# Patient Record
Sex: FEMALE | Race: WHITE | NOT HISPANIC OR LATINO | URBAN - METROPOLITAN AREA
[De-identification: names, ages, dates, MRNs, and addresses within clinical notes are randomized per-mention and may not be internally consistent; named-entity substitution may affect disease eponyms.]

---

## 2022-05-12 ENCOUNTER — POST-OP (OUTPATIENT)
Dept: URBAN - METROPOLITAN AREA CLINIC 15 | Facility: CLINIC | Age: 72
End: 2022-05-12

## 2022-05-12 DIAGNOSIS — C44.1192: ICD-10-CM

## 2022-05-12 DIAGNOSIS — Z98.890: ICD-10-CM

## 2022-05-12 PROCEDURE — 99024 POSTOP FOLLOW-UP VISIT: CPT

## 2022-05-12 ASSESSMENT — VISUAL ACUITY: OU_CC: 20/30

## 2022-07-09 RX ORDER — ACYCLOVIR 400 MG/1
TABLET ORAL
COMMUNITY

## 2022-07-09 RX ORDER — LORATADINE 10 MG/1
TABLET ORAL
COMMUNITY

## 2022-07-09 RX ORDER — ESTRADIOL 1 MG/1
TABLET ORAL
COMMUNITY

## 2022-07-09 RX ORDER — GABAPENTIN 300 MG/1
1 CAPSULE ORAL
COMMUNITY

## 2022-07-11 ENCOUNTER — ESTABLISHED PATIENT (OUTPATIENT)
Dept: URBAN - METROPOLITAN AREA CLINIC 14 | Facility: CLINIC | Age: 72
End: 2022-07-11

## 2022-07-11 DIAGNOSIS — H43.813: ICD-10-CM

## 2022-07-11 DIAGNOSIS — H25.13: ICD-10-CM

## 2022-07-11 PROCEDURE — 99214 OFFICE O/P EST MOD 30 MIN: CPT

## 2022-07-11 PROCEDURE — 92136 OPHTHALMIC BIOMETRY: CPT

## 2022-07-11 ASSESSMENT — KERATOMETRY
OD_K1POWER_DIOPTERS: 45.00
OS_K2POWER_DIOPTERS: 45.75
OS_AXISANGLE2_DEGREES: 157
OS_K1POWER_DIOPTERS: 45.00
OS_AXISANGLE_DEGREES: 67
OD_K2POWER_DIOPTERS: 46.00
OD_AXISANGLE_DEGREES: 163
OD_AXISANGLE2_DEGREES: 73

## 2022-07-11 ASSESSMENT — VISUAL ACUITY
OD_PH: 20/50
OS_PH: 20/50
OD_CC: 20/70
OS_CC: 20/70
OS_BCVA: 20/50
OD_BCVA: 20/50

## 2022-07-11 ASSESSMENT — TONOMETRY
OS_IOP_MMHG: 23
OD_IOP_MMHG: 26

## 2022-07-13 ENCOUNTER — POST-OP (OUTPATIENT)
Dept: URBAN - METROPOLITAN AREA CLINIC 14 | Facility: CLINIC | Age: 72
End: 2022-07-13

## 2022-07-13 DIAGNOSIS — C44.1192: ICD-10-CM

## 2022-07-13 DIAGNOSIS — Z98.890: ICD-10-CM

## 2022-07-13 PROCEDURE — 99024 POSTOP FOLLOW-UP VISIT: CPT

## 2022-07-13 ASSESSMENT — VISUAL ACUITY
OS_CC: 20/70
OD_CC: 20/70

## 2022-07-13 NOTE — PATIENT DISCUSSION
Patient is healing well status post MOHS reconstruction.  The left lower eyelid position is anatomically correct at this time.  No evidence of residual ectropion at this time.  Recommend lubrication as needed.  Follow up prn.

## 2022-09-15 ENCOUNTER — POST OP/EVAL OF SECOND EYE (OUTPATIENT)
Dept: URBAN - METROPOLITAN AREA CLINIC 14 | Facility: CLINIC | Age: 72
End: 2022-09-15

## 2022-09-15 DIAGNOSIS — Z96.1: ICD-10-CM

## 2022-09-15 DIAGNOSIS — Z98.890: ICD-10-CM

## 2022-09-15 DIAGNOSIS — H25.11: ICD-10-CM

## 2022-09-15 PROCEDURE — 92136 OPHTHALMIC BIOMETRY: CPT

## 2022-09-15 PROCEDURE — 99024 POSTOP FOLLOW-UP VISIT: CPT

## 2022-09-15 ASSESSMENT — VISUAL ACUITY
OD_BCVA: 20/50
OD_SC: 20/60
OS_SC: 20/50

## 2022-09-15 ASSESSMENT — TONOMETRY
OD_IOP_MMHG: 17
OS_IOP_MMHG: 20